# Patient Record
Sex: FEMALE | Race: WHITE | URBAN - METROPOLITAN AREA
[De-identification: names, ages, dates, MRNs, and addresses within clinical notes are randomized per-mention and may not be internally consistent; named-entity substitution may affect disease eponyms.]

---

## 2022-04-26 ENCOUNTER — OTHER (OUTPATIENT)
Dept: URBAN - METROPOLITAN AREA CLINIC 17 | Facility: CLINIC | Age: 77
Setting detail: DERMATOLOGY
End: 2022-04-26

## 2022-04-26 DIAGNOSIS — D48.5 NEOPLASM OF UNCERTAIN BEHAVIOR OF SKIN: ICD-10-CM

## 2022-04-26 PROCEDURE — OTHER COSMETIC CONSULT: OTHER

## 2022-06-08 ENCOUNTER — APPOINTMENT (OUTPATIENT)
Dept: URBAN - METROPOLITAN AREA CLINIC 191 | Age: 77
Setting detail: DERMATOLOGY
End: 2022-06-08

## 2022-06-08 DIAGNOSIS — Z41.9 ENCOUNTER FOR PROCEDURE FOR PURPOSES OTHER THAN REMEDYING HEALTH STATE, UNSPECIFIED: ICD-10-CM

## 2022-06-08 PROCEDURE — OTHER INVENTORY: OTHER

## 2022-06-08 PROCEDURE — OTHER BOTOX (U OR CC): OTHER

## 2022-06-08 PROCEDURE — OTHER ADDITIONAL NOTES: OTHER

## 2022-06-08 PROCEDURE — OTHER BOTOX (U OR CC) ADDITIVE: OTHER

## 2022-06-08 PROCEDURE — OTHER COSMETIC QUOTE: OTHER

## 2022-06-08 PROCEDURE — OTHER COSMETIC CONSULTATION: FILLERS: OTHER

## 2022-06-08 ASSESSMENT — LOCATION SIMPLE DESCRIPTION DERM
LOCATION SIMPLE: LEFT CHEEK
LOCATION SIMPLE: GLABELLA
LOCATION SIMPLE: LEFT FOREHEAD
LOCATION SIMPLE: RIGHT EYEBROW
LOCATION SIMPLE: LEFT TEMPLE
LOCATION SIMPLE: RIGHT TEMPLE
LOCATION SIMPLE: LEFT FOREHEAD
LOCATION SIMPLE: RIGHT CHEEK
LOCATION SIMPLE: RIGHT FOREHEAD

## 2022-06-08 ASSESSMENT — LOCATION DETAILED DESCRIPTION DERM
LOCATION DETAILED: LEFT SUPERIOR LATERAL MALAR CHEEK
LOCATION DETAILED: LEFT INFERIOR LATERAL FOREHEAD
LOCATION DETAILED: RIGHT INFERIOR FOREHEAD
LOCATION DETAILED: RIGHT SUPERIOR LATERAL MALAR CHEEK
LOCATION DETAILED: GLABELLA
LOCATION DETAILED: LEFT INFERIOR CENTRAL MALAR CHEEK
LOCATION DETAILED: RIGHT LATERAL EYEBROW
LOCATION DETAILED: LEFT INFERIOR MEDIAL FOREHEAD
LOCATION DETAILED: LEFT MID TEMPLE
LOCATION DETAILED: RIGHT MID TEMPLE

## 2022-06-08 ASSESSMENT — LOCATION ZONE DERM
LOCATION ZONE: FACE
LOCATION ZONE: FACE

## 2022-06-08 NOTE — PROCEDURE: ADDITIONAL NOTES
Additional Notes: Discussed Volumma procedure, risks and advised of scheduling protocol.
Detail Level: Simple
Render Risk Assessment In Note?: no

## 2022-06-08 NOTE — PROCEDURE: BOTOX (U OR CC)
Detail Level: Detailed
Show Inventory Tab: Show
Measure In Units Or Cc's?: units
Post-Care Instructions: Do not lie down for 4 hours and limit physical activity today. Do not massage injected areas. Make up can be applied tomorrow. If you develop a headache, it is ok to take tylenol or ibuprofen. Botox usually starts working in 4-5 days.
Consent: Verbal consent obtained. Risks include but not limited to lid/brow ptosis, bruising, swelling, diplopia, temporary effect, incomplete chemical denervation.
Dilution (U/0.1 Cc): 2
Quantity Per Injection Site (Units): 5

## 2022-06-08 NOTE — PROCEDURE: COSMETIC QUOTE
Misc Procedure 4 Units: 0
Face Procedure 8 Price/Unit (In Dollars- Use Only Numbers And Decimals): 0.00
Detail Level: Zone
Include Sales Tax: No
Send Charges To Patient Encounter: Yes
Quote Title (Optional- Will Act As A Header): Onel WILKINSON
Injectable 1: Volumma XC
Injectable 1 Units: 1
Injectable 1 Price/Unit (In Dollars- Use Only Numbers And Decimals): 750

## 2022-07-05 ENCOUNTER — APPOINTMENT (OUTPATIENT)
Dept: URBAN - METROPOLITAN AREA CLINIC 191 | Age: 77
Setting detail: DERMATOLOGY
End: 2022-07-05

## 2022-12-15 ENCOUNTER — APPOINTMENT (OUTPATIENT)
Dept: URBAN - METROPOLITAN AREA CLINIC 191 | Age: 77
Setting detail: DERMATOLOGY
End: 2022-12-15

## 2022-12-15 DIAGNOSIS — Z41.9 ENCOUNTER FOR PROCEDURE FOR PURPOSES OTHER THAN REMEDYING HEALTH STATE, UNSPECIFIED: ICD-10-CM

## 2022-12-15 PROCEDURE — OTHER JUVEDERM VOLUMA XC INJECTION: OTHER

## 2022-12-15 ASSESSMENT — LOCATION SIMPLE DESCRIPTION DERM
LOCATION SIMPLE: LEFT CHEEK
LOCATION SIMPLE: RIGHT CHEEK

## 2022-12-15 ASSESSMENT — LOCATION ZONE DERM: LOCATION ZONE: FACE

## 2022-12-15 ASSESSMENT — LOCATION DETAILED DESCRIPTION DERM
LOCATION DETAILED: RIGHT CENTRAL MALAR CHEEK
LOCATION DETAILED: LEFT CENTRAL MALAR CHEEK

## 2022-12-15 NOTE — PROCEDURE: JUVEDERM VOLUMA XC INJECTION
Dorsal Hands Filler  Volume In Cc: 0
Include Cannula Information In Note?: No
Filler: Juvederm Voluma XC
Detail Level: Simple
Post-Care Instructions: Patient instructed to apply ice to reduce swelling.
Additional Anesthesia Volume In Cc: 6
Number Of Syringes (Required For Inventory): 1
Anesthesia Type: 1% lidocaine with epinephrine
Use Map Statement For Sites (Optional): Yes
Anesthesia Volume In Cc: 0.5
Cheeks Filler Volume In Cc: 0.9
Show Inventory Tab: Show
Topical Anesthesia?: BLT cream (benzocaine 20%, lidocaine 10%, tetracaine 10%)
Consent: Verbal consent obtained. Risks include but not limited to bruising, beading, irregular texture, ulceration, infection, allergic reaction, scar formation, incomplete augmentation, temporary nature, procedural pain.
Procedural Text: The filler was administered to the treatment areas noted above.
Map Statment: See Attach Map for Complete Details

## 2023-01-20 ENCOUNTER — APPOINTMENT (OUTPATIENT)
Dept: URBAN - METROPOLITAN AREA CLINIC 191 | Age: 78
Setting detail: DERMATOLOGY
End: 2023-01-21

## 2023-01-20 DIAGNOSIS — Z41.9 ENCOUNTER FOR PROCEDURE FOR PURPOSES OTHER THAN REMEDYING HEALTH STATE, UNSPECIFIED: ICD-10-CM

## 2023-01-20 PROCEDURE — OTHER MIPS QUALITY: OTHER

## 2023-01-20 PROCEDURE — OTHER PRESCRIPTION MEDICATION MANAGEMENT: OTHER

## 2023-01-20 NOTE — PROCEDURE: PRESCRIPTION MEDICATION MANAGEMENT
Detail Level: Zone
Plan: Patient was not happy with the way the fillers looked \\nThe cheekbones were too big for her liking and it didn’t lift her jowls. Discussed cheekbones had to be filled in order to lift jowls. Discussed dissolving filler w/ hylenex vs. placing more filler on the R cheek. L cheek more filled than right. Pt to think about it.
Render In Strict Bullet Format?: No